# Patient Record
Sex: MALE | Race: WHITE | NOT HISPANIC OR LATINO | URBAN - NONMETROPOLITAN AREA
[De-identification: names, ages, dates, MRNs, and addresses within clinical notes are randomized per-mention and may not be internally consistent; named-entity substitution may affect disease eponyms.]

---

## 2020-11-16 ENCOUNTER — IMPORTED ENCOUNTER (OUTPATIENT)
Dept: URBAN - NONMETROPOLITAN AREA CLINIC 1 | Facility: CLINIC | Age: 60
End: 2020-11-16

## 2020-11-16 PROBLEM — E11.3293: Noted: 2020-11-16

## 2020-11-16 PROBLEM — H25.813: Noted: 2020-11-16

## 2020-11-16 PROCEDURE — 92004 COMPRE OPH EXAM NEW PT 1/>: CPT

## 2020-11-16 NOTE — PATIENT DISCUSSION
Cataract(s)-Visually significant cataract OU .-Cataract(s) causing symptomatic impairment of visual function not correctable with a tolerable change in glasses or contact lenses lighting or non-operative means resulting in specific activity limitations and/or participation restrictions including but not limited to reading viewing television driving or meeting vocational or recreational needs. -Expectation is clearer vision and functional improvement in symptoms as well as reduced glare disability after cataract removal.-Order IOLMaster and OPD today. -Recommend Toric IOL   /   Limbal Relaxing Incisions based on today's OPD testing and lifestyle questionnaire.-All questions were answered regarding surgery including pre and post-op medications appointments activity restrictions and anesthetic usage.-The risks benefits and alternatives and special risk factors for the patient were discussed in detail including but not limited to: bleeding infection retinal detachment vitreous loss problems with the implant and possible need for additional surgery.-Although rare the possibility of complete vision loss was discussed.-The possible need for glasses post-operatively was discussed.-Order medical clearance exam based on history of Diabetes-Patient elects to proceed with cataract surgery OS. Will schedule at patient's convenience and re-evaluate OD  in the future. Discussed all lens options with patient Discussed LenSx vs Trad Discussed Toric and benefits from this lens. Advised patient the Toric will eliminate astigmatism but that the diabetic retinopathy will affect his vision outcome. Discussed Diabetes with patient and stressed importance of good blood sugar control and the health of back of his eyes. Few dot blots noted OU today but (-) edema noted.

## 2020-12-21 ENCOUNTER — IMPORTED ENCOUNTER (OUTPATIENT)
Dept: URBAN - NONMETROPOLITAN AREA CLINIC 1 | Facility: CLINIC | Age: 60
End: 2020-12-21

## 2020-12-21 PROBLEM — H25.813: Noted: 2020-12-21

## 2020-12-21 PROBLEM — E11.3293: Noted: 2020-12-21

## 2020-12-27 PROBLEM — I10: Noted: 2020-12-27

## 2020-12-27 PROBLEM — Z01.818: Noted: 2020-12-27

## 2020-12-27 PROBLEM — E11.9: Noted: 2020-12-27

## 2020-12-27 PROBLEM — F32.89: Noted: 2020-12-27

## 2020-12-27 PROBLEM — E78.5: Noted: 2020-12-27

## 2020-12-28 ENCOUNTER — IMPORTED ENCOUNTER (OUTPATIENT)
Dept: URBAN - NONMETROPOLITAN AREA CLINIC 1 | Facility: CLINIC | Age: 60
End: 2020-12-28

## 2021-01-08 ENCOUNTER — IMPORTED ENCOUNTER (OUTPATIENT)
Dept: URBAN - NONMETROPOLITAN AREA CLINIC 1 | Facility: CLINIC | Age: 61
End: 2021-01-08

## 2021-01-08 PROBLEM — Z98.42: Noted: 2021-01-08

## 2021-01-08 PROBLEM — H25.811: Noted: 2021-01-08

## 2021-01-08 PROCEDURE — 0356T INSERTION OF DRUG-ELUTING IMPLANT (INCLUDING PUNCTAL DILATION AND IMPLANT REMOVAL WHEN PERFORMED) INTO LACRIMAL CANALICULUS, EACH: CPT

## 2021-01-08 PROCEDURE — 66984 XCAPSL CTRC RMVL W/O ECP: CPT

## 2021-01-08 PROCEDURE — 99024 POSTOP FOLLOW-UP VISIT: CPT

## 2021-01-08 PROCEDURE — 92136 OPHTHALMIC BIOMETRY: CPT

## 2021-01-08 NOTE — PATIENT DISCUSSION
Same day s/p PCIOL OS stand/trad (01/08/21)-Pt doing well s/p PCIOL. -Continue post-op gtts according to instruction sheet and sleep with eye shield over eye for 7 nights.-Avoid bending at the waist lifting anything over 5lbs and dirty or brandee environments. Cataract OD- Recommend proceed with cataract surgery as previously scheduled and discussed with Dr. Skyla Dietz.

## 2021-01-14 ENCOUNTER — IMPORTED ENCOUNTER (OUTPATIENT)
Dept: URBAN - NONMETROPOLITAN AREA CLINIC 1 | Facility: CLINIC | Age: 61
End: 2021-01-14

## 2021-01-14 PROCEDURE — 99024 POSTOP FOLLOW-UP VISIT: CPT

## 2021-01-14 NOTE — PATIENT DISCUSSION
1 week s/p PCIOL OS stand/trad (01/08/21)-Pt doing well at 1 week s/p PCIOL. -Continue post-op gtts according to instruction sheet.-Okay to resume usual activites and d/c eye shield. Cataract OD- Recommend proceed with cataract surgery as previously scheduled and discussed with Dr. Joleen Zaidi.

## 2021-01-22 ENCOUNTER — IMPORTED ENCOUNTER (OUTPATIENT)
Dept: URBAN - NONMETROPOLITAN AREA CLINIC 1 | Facility: CLINIC | Age: 61
End: 2021-01-22

## 2021-01-22 PROCEDURE — 99024 POSTOP FOLLOW-UP VISIT: CPT

## 2021-01-22 PROCEDURE — 66984 XCAPSL CTRC RMVL W/O ECP: CPT

## 2021-01-22 PROCEDURE — 92136 OPHTHALMIC BIOMETRY: CPT

## 2021-01-22 NOTE — PATIENT DISCUSSION
Same day s/p PCIOL OD (1/22/21) Stand/Trad -Pt doing well s/p PCIOL. -Continue post-op gtts according to instruction sheet and sleep with eye shield over eye for 7 nights.-Avoid bending at the waist lifting anything over 5lbs and dirty or brandee environments. 2 week s/p PCIOL OS (1/8/21) Stand/Trad-Pt doing well at 2 week s/p PCIOL OS.-Continue post-op gtts according to instruction sheet.-Okay to resume usual activities and d/c eye shield.

## 2021-02-23 PROBLEM — Z98.41: Noted: 2021-02-23

## 2021-02-23 PROBLEM — Z98.42: Noted: 2021-02-23

## 2022-04-15 ASSESSMENT — VISUAL ACUITY
OD_PAM: 20/20
OS_SC: 20/25
OS_AM: 20/25
OS_GLARE: 20/300
OD_CC: 20/20
OS_GLARE: 20/300
OD_GLARE: 20/200
OS_SC: 20/25
OD_SC: 20/20
OS_CC: 20/50
OD_PAM: 20/20
OD_GLARE: 20/200
OS_CC: 20/200-
OD_SC: 20/50
OD_PAM: 20/20
OD_CC: 20/40
OD_PH: 20/25-2
OD_GLARE: 20/200
OS_CC: 20/40
OD_SC: 20/20
OD_CC: 20/100
OS_AM: 20/25
OS_CC: 20/50
OS_CC: 20/20-2
OS_PH: 20/100-
OS_SC: 20/20-2

## 2022-04-15 ASSESSMENT — TONOMETRY
OS_IOP_MMHG: 13
OD_IOP_MMHG: 14
OD_IOP_MMHG: 17
OS_IOP_MMHG: 18
OS_IOP_MMHG: 14
OD_IOP_MMHG: 15
OS_IOP_MMHG: 13
OD_IOP_MMHG: 13